# Patient Record
Sex: MALE | Race: WHITE | NOT HISPANIC OR LATINO | Employment: PART TIME | URBAN - METROPOLITAN AREA
[De-identification: names, ages, dates, MRNs, and addresses within clinical notes are randomized per-mention and may not be internally consistent; named-entity substitution may affect disease eponyms.]

---

## 2018-07-07 ENCOUNTER — OFFICE VISIT (OUTPATIENT)
Dept: RETAIL CLINIC | Facility: CLINIC | Age: 43
End: 2018-07-07

## 2018-07-07 VITALS
RESPIRATION RATE: 18 BRPM | OXYGEN SATURATION: 99 % | TEMPERATURE: 97.8 F | DIASTOLIC BLOOD PRESSURE: 78 MMHG | HEART RATE: 82 BPM | SYSTOLIC BLOOD PRESSURE: 122 MMHG

## 2018-07-07 DIAGNOSIS — J02.0 ACUTE STREPTOCOCCAL PHARYNGITIS: Primary | ICD-10-CM

## 2018-07-07 LAB
EXPIRATION DATE: ABNORMAL
INTERNAL CONTROL: ABNORMAL
Lab: ABNORMAL
S PYO AG THROAT QL: POSITIVE

## 2018-07-07 PROCEDURE — 87880 STREP A ASSAY W/OPTIC: CPT | Performed by: NURSE PRACTITIONER

## 2018-07-07 PROCEDURE — 99213 OFFICE O/P EST LOW 20 MIN: CPT | Performed by: NURSE PRACTITIONER

## 2018-07-07 RX ORDER — AMOXICILLIN 875 MG/1
875 TABLET, COATED ORAL EVERY 12 HOURS SCHEDULED
Qty: 14 TABLET | Refills: 0 | Status: SHIPPED | OUTPATIENT
Start: 2018-07-07 | End: 2018-07-14

## 2018-07-07 NOTE — PATIENT INSTRUCTIONS
Pharyngitis  Pharyngitis is a sore throat (pharynx). There is redness, pain, and swelling of your throat.  Follow these instructions at home:  · Drink enough fluids to keep your pee (urine) clear or pale yellow.  · Only take medicine as told by your doctor.  ? You may get sick again if you do not take medicine as told. Finish your medicines, even if you start to feel better.  ? Do not take aspirin.  · Rest.  · Rinse your mouth (gargle) with salt water (½ tsp of salt per 1 qt of water) every 1-2 hours. This will help the pain.  · If you are not at risk for choking, you can suck on hard candy or sore throat lozenges.  Contact a doctor if:  · You have large, tender lumps on your neck.  · You have a rash.  · You cough up green, yellow-brown, or bloody spit.  Get help right away if:  · You have a stiff neck.  · You drool or cannot swallow liquids.  · You throw up (vomit) or are not able to keep medicine or liquids down.  · You have very bad pain that does not go away with medicine.  · You have problems breathing (not from a stuffy nose).  This information is not intended to replace advice given to you by your health care provider. Make sure you discuss any questions you have with your health care provider.  Document Released: 06/05/2009 Document Revised: 05/25/2017 Document Reviewed: 08/25/2014  [employer] Patient Education © 2017 [employer]

## 2018-07-07 NOTE — PROGRESS NOTES
Subjective   Desean Mirza is a 42 y.o. male.     Sore Throat    This is a new problem. The current episode started yesterday. The problem has been gradually worsening. There has been no fever. The pain is at a severity of 3/10. Associated symptoms include coughing, a plugged ear sensation, shortness of breath and swollen glands. Pertinent negatives include no congestion or headaches. He has tried acetaminophen for the symptoms. The treatment provided mild relief.   Cough   This is a new problem. The current episode started yesterday. The problem has been gradually worsening. Associated symptoms include a sore throat and shortness of breath. Pertinent negatives include no headaches. Nothing aggravates the symptoms.        The following portions of the patient's history were reviewed and updated as appropriate: allergies, current medications, past family history, past medical history, past social history, past surgical history and problem list.    Review of Systems   HENT: Positive for facial swelling, sinus pressure and sore throat. Negative for congestion.    Respiratory: Positive for cough and shortness of breath.    Gastrointestinal: Negative.    Musculoskeletal: Negative.    Neurological: Negative.        Objective   Physical Exam   Constitutional: He is cooperative. No distress.   HENT:   Head: Normocephalic.   Right Ear: Hearing, tympanic membrane, external ear and ear canal normal.   Left Ear: Hearing, tympanic membrane, external ear and ear canal normal.   Nose: Mucosal edema and rhinorrhea present.   Mouth/Throat: Posterior oropharyngeal edema and posterior oropharyngeal erythema present. Tonsils are 3+ on the right. Tonsils are 3+ on the left. Tonsillar exudate.   Eyes: Conjunctivae, EOM and lids are normal. Pupils are equal, round, and reactive to light.   Neck: Trachea normal and full passive range of motion without pain.   Cardiovascular: Normal rate, regular rhythm and normal pulses.     Pulmonary/Chest: Effort normal and breath sounds normal.   Lymphadenopathy:     He has cervical adenopathy.        Right cervical: Superficial cervical adenopathy present.        Left cervical: Superficial cervical adenopathy present.   Neurological: He is alert.   Skin: Skin is warm. Capillary refill takes less than 2 seconds.   Psychiatric: He has a normal mood and affect. His speech is normal and behavior is normal.   Vitals reviewed.        Assessment/Plan   Desean was seen today for sore throat and cough.    Diagnoses and all orders for this visit:    Acute streptococcal pharyngitis  -     POC Rapid Strep A    Other orders  -     amoxicillin (AMOXIL) 875 MG tablet; Take 1 tablet by mouth Every 12 (Twelve) Hours for 7 days.

## 2019-04-09 ENCOUNTER — OFFICE VISIT (OUTPATIENT)
Dept: RETAIL CLINIC | Facility: CLINIC | Age: 44
End: 2019-04-09

## 2019-04-09 VITALS
OXYGEN SATURATION: 97 % | RESPIRATION RATE: 18 BRPM | HEART RATE: 74 BPM | DIASTOLIC BLOOD PRESSURE: 78 MMHG | TEMPERATURE: 97.8 F | SYSTOLIC BLOOD PRESSURE: 142 MMHG

## 2019-04-09 DIAGNOSIS — J06.9 ACUTE URI: Primary | ICD-10-CM

## 2019-04-09 PROCEDURE — 99213 OFFICE O/P EST LOW 20 MIN: CPT | Performed by: NURSE PRACTITIONER

## 2019-04-09 RX ORDER — AZITHROMYCIN 250 MG/1
TABLET, FILM COATED ORAL
Qty: 6 TABLET | Refills: 0 | Status: SHIPPED | OUTPATIENT
Start: 2019-04-09

## 2019-04-09 NOTE — PROGRESS NOTES
Subjective   Desean Mirza is a 43 y.o. male.     URI    This is a new problem. The current episode started yesterday. There has been no fever. Associated symptoms include coughing. Pertinent negatives include no congestion, dysuria, headaches, nausea, plugged ear sensation, rhinorrhea, sinus pain, sore throat, vomiting or wheezing. Associated symptoms comments: Shortness of breath, difficulty taking a deep breath. Treatments tried: cough syrup. The treatment provided no relief.        The following portions of the patient's history were reviewed and updated as appropriate: allergies, current medications, past family history, past medical history, past social history, past surgical history and problem list.    Review of Systems   HENT: Negative for congestion, rhinorrhea and sore throat.    Respiratory: Positive for cough. Negative for wheezing.    Cardiovascular: Negative.    Gastrointestinal: Negative.  Negative for nausea and vomiting.   Genitourinary: Negative for dysuria.   Musculoskeletal: Negative.    Skin: Negative.    Neurological: Negative.        Objective   Physical Exam   Constitutional: He is cooperative. No distress.   HENT:   Head: Normocephalic.   Right Ear: Hearing, external ear and ear canal normal. A middle ear effusion is present.   Left Ear: Hearing, external ear and ear canal normal. A middle ear effusion is present.   Nose: Mucosal edema present.   Mouth/Throat: Posterior oropharyngeal erythema present.   Eyes: Conjunctivae, EOM and lids are normal. Pupils are equal, round, and reactive to light.   Neck: Trachea normal and full passive range of motion without pain.   Cardiovascular: Normal rate, regular rhythm and normal pulses.   Pulmonary/Chest: Effort normal. He has decreased breath sounds.   Poor effort, no chest pain, no distress   Lymphadenopathy:     He has cervical adenopathy.        Right cervical: Superficial cervical adenopathy present.        Left cervical: Superficial cervical  adenopathy present.   Neurological: He is alert.   Skin: Skin is warm. Capillary refill takes less than 2 seconds.   Psychiatric: He has a normal mood and affect. His speech is normal and behavior is normal.   Vitals reviewed.        Assessment/Plan   Desean was seen today for uri.    Diagnoses and all orders for this visit:    Acute URI    Other orders  -     azithromycin (ZITHROMAX Z-TORO) 250 MG tablet; Take 2 tablets the first day, then 1 tablet daily for 4 days.

## 2019-04-09 NOTE — PATIENT INSTRUCTIONS

## 2021-04-06 ENCOUNTER — BULK ORDERING (OUTPATIENT)
Dept: CASE MANAGEMENT | Facility: OTHER | Age: 46
End: 2021-04-06

## 2021-04-06 DIAGNOSIS — Z23 IMMUNIZATION DUE: ICD-10-CM

## 2023-12-04 ENCOUNTER — OFFICE VISIT (OUTPATIENT)
Dept: FAMILY MEDICINE CLINIC | Facility: CLINIC | Age: 48
End: 2023-12-04
Payer: COMMERCIAL

## 2023-12-04 VITALS
HEART RATE: 93 BPM | DIASTOLIC BLOOD PRESSURE: 88 MMHG | SYSTOLIC BLOOD PRESSURE: 138 MMHG | HEIGHT: 74 IN | BODY MASS INDEX: 24.92 KG/M2 | WEIGHT: 194.2 LBS | RESPIRATION RATE: 16 BRPM | OXYGEN SATURATION: 98 %

## 2023-12-04 DIAGNOSIS — Z23 IMMUNIZATION DUE: ICD-10-CM

## 2023-12-04 DIAGNOSIS — I10 PRIMARY HYPERTENSION: ICD-10-CM

## 2023-12-04 DIAGNOSIS — L40.9 PSORIASIS: ICD-10-CM

## 2023-12-04 DIAGNOSIS — Z72.0 TOBACCO USE: Primary | ICD-10-CM

## 2023-12-04 DIAGNOSIS — Z13.220 SCREENING FOR HYPERLIPIDEMIA: ICD-10-CM

## 2023-12-04 DIAGNOSIS — Z13.1 DIABETES MELLITUS SCREENING: ICD-10-CM

## 2023-12-04 DIAGNOSIS — Z11.59 ENCOUNTER FOR HCV SCREENING TEST FOR LOW RISK PATIENT: ICD-10-CM

## 2023-12-04 DIAGNOSIS — Z12.11 COLON CANCER SCREENING: ICD-10-CM

## 2023-12-04 RX ORDER — VARENICLINE TARTRATE 0.5 (11)-1
KIT ORAL
Qty: 53 EACH | Refills: 0 | Status: SHIPPED | OUTPATIENT
Start: 2023-12-04 | End: 2024-01-01

## 2023-12-04 NOTE — PROGRESS NOTES
"        Aidan Elias MD  Internal Medicine  [phone] (office)             12/04/2023    Patient Information  Desean Mirza                                                                                          [address] [facility] [address]  1975        Chief Complaint   Patient presents with    Establish Care          History of Present Illness:    Desean Mirza is a 48 y.o. male who presents to the office to establish care with new PCP.     He has untreated psoriasis and is wanting to reestablish care with dermatology.     Patient smoking 1/2ppd and wishing to quit. He's never tried to quit before.     Due for CRC screening.     He exercises by walking dogs a few miles per day.         Allergies   Allergen Reactions    Oxycodone            Current Outpatient Medications:     Varenicline Tartrate, Starter, 0.5 MG X 11 & 1 MG X 42 tablet therapy pack, Take 0.5 mg by mouth Daily for 3 days, THEN 0.5 mg Every 12 (Twelve) Hours for 4 days, THEN 1 mg Every 12 (Twelve) Hours for 21 days., Disp: 53 each, Rfl: 0      Social History     Socioeconomic History    Marital status:    Tobacco Use    Smoking status: Every Day     Packs/day: .5     Types: Cigarettes     Start date: 1999   Substance and Sexual Activity    Alcohol use: Yes     Comment: social    Drug use: No    Sexual activity: Defer         Vitals:    12/04/23 1312   BP: 138/88   BP Location: Right arm   Patient Position: Sitting   Cuff Size: Adult   Pulse: 93   Resp: 16   SpO2: 98%   Weight: 88.1 kg (194 lb 3.2 oz)   Height: 188 cm (74\")      Wt Readings from Last 3 Encounters:   12/04/23 88.1 kg (194 lb 3.2 oz)   08/12/14 78.7 kg (173 lb 8 oz)   01/10/14 82.8 kg (182 lb 8 oz)     Body mass index is 24.93 kg/m².      Physical Exam  Vitals reviewed.   Constitutional:       Appearance: Normal appearance. He is not ill-appearing.   Neurological:      Mental Status: He is alert and oriented to person, place, and time. "   Psychiatric:         Mood and Affect: Mood normal.         Behavior: Behavior normal.            Assessment/Plan:    Diagnoses and all orders for this visit:    1. Tobacco use (Primary)  Assessment & Plan:  Encouraged patient to stop smoking. Will try dual NRT and varenicline. Follow-up in two weeks to assess progress.     Orders:  -     Varenicline Tartrate, Starter, 0.5 MG X 11 & 1 MG X 42 tablet therapy pack; Take 0.5 mg by mouth Daily for 3 days, THEN 0.5 mg Every 12 (Twelve) Hours for 4 days, THEN 1 mg Every 12 (Twelve) Hours for 21 days.  Dispense: 53 each; Refill: 0    2. Psoriasis  -     Ambulatory Referral to Dermatology    3. Screening for hyperlipidemia  -     Lipid Panel  -     Apolipoprotein B    4. Primary hypertension  Assessment & Plan:  Hypertension is newly identified.  Regular aerobic exercise.  Stop smoking.  Blood pressure will be reassessed in 2 weeks.    Orders:  -     Comprehensive Metabolic Panel  -     Microalbumin / Creatinine Urine Ratio - Urine, Clean Catch    5. Encounter for HCV screening test for low risk patient  -     Hepatitis C Virus Ab Cascade    6. Diabetes mellitus screening  -     Hemoglobin A1c    7. Colon cancer screening  -     Cologuard - Stool, Per Rectum; Future    8. Immunization due  -     Fluzone (or Fluarix & Flulaval for VFC) >6 Mos ([id number])  -     Tdap Vaccine => 8yo IM (BOOSTRIX)

## 2023-12-04 NOTE — ASSESSMENT & PLAN NOTE
Encouraged patient to stop smoking. Will try dual NRT and varenicline. Follow-up in two weeks to assess progress.

## 2023-12-04 NOTE — ASSESSMENT & PLAN NOTE
Hypertension is newly identified.  Regular aerobic exercise.  Stop smoking.  Blood pressure will be reassessed in 2 weeks.

## 2025-04-14 ENCOUNTER — OFFICE VISIT (OUTPATIENT)
Dept: FAMILY MEDICINE CLINIC | Facility: CLINIC | Age: 50
End: 2025-04-14
Payer: COMMERCIAL

## 2025-04-14 VITALS
TEMPERATURE: 96.8 F | OXYGEN SATURATION: 98 % | WEIGHT: 199.1 LBS | HEART RATE: 95 BPM | BODY MASS INDEX: 25.55 KG/M2 | HEIGHT: 74 IN | DIASTOLIC BLOOD PRESSURE: 70 MMHG | SYSTOLIC BLOOD PRESSURE: 160 MMHG | RESPIRATION RATE: 16 BRPM

## 2025-04-14 DIAGNOSIS — Z86.59 HISTORY OF ATTENTION DEFICIT HYPERACTIVITY DISORDER (ADHD): ICD-10-CM

## 2025-04-14 DIAGNOSIS — Z72.0 TOBACCO USE: ICD-10-CM

## 2025-04-14 DIAGNOSIS — I10 HYPERTENSION, UNSPECIFIED TYPE: Primary | ICD-10-CM

## 2025-04-14 DIAGNOSIS — L40.9 PSORIASIS: ICD-10-CM

## 2025-04-14 DIAGNOSIS — Z00.00 HEALTHCARE MAINTENANCE: ICD-10-CM

## 2025-04-14 RX ORDER — LISINOPRIL AND HYDROCHLOROTHIAZIDE 10; 12.5 MG/1; MG/1
1 TABLET ORAL DAILY
Qty: 90 TABLET | Refills: 3 | Status: SHIPPED | OUTPATIENT
Start: 2025-04-14

## 2025-05-14 ENCOUNTER — TELEPHONE (OUTPATIENT)
Dept: PSYCHIATRY | Facility: CLINIC | Age: 50
End: 2025-05-14
Payer: COMMERCIAL